# Patient Record
Sex: FEMALE | Race: WHITE
[De-identification: names, ages, dates, MRNs, and addresses within clinical notes are randomized per-mention and may not be internally consistent; named-entity substitution may affect disease eponyms.]

---

## 2017-11-08 ENCOUNTER — APPOINTMENT (OUTPATIENT)
Dept: OTOLARYNGOLOGY | Facility: CLINIC | Age: 37
End: 2017-11-08
Payer: COMMERCIAL

## 2017-11-08 VITALS
WEIGHT: 138 LBS | DIASTOLIC BLOOD PRESSURE: 79 MMHG | BODY MASS INDEX: 26.06 KG/M2 | HEART RATE: 70 BPM | SYSTOLIC BLOOD PRESSURE: 123 MMHG | TEMPERATURE: 97.2 F | HEIGHT: 61 IN

## 2017-11-08 PROCEDURE — 99213 OFFICE O/P EST LOW 20 MIN: CPT

## 2019-08-05 ENCOUNTER — APPOINTMENT (OUTPATIENT)
Dept: OTOLARYNGOLOGY | Facility: CLINIC | Age: 39
End: 2019-08-05
Payer: COMMERCIAL

## 2019-08-05 VITALS
DIASTOLIC BLOOD PRESSURE: 80 MMHG | WEIGHT: 138 LBS | HEART RATE: 82 BPM | HEIGHT: 61 IN | OXYGEN SATURATION: 98 % | BODY MASS INDEX: 26.06 KG/M2 | SYSTOLIC BLOOD PRESSURE: 120 MMHG

## 2019-08-05 DIAGNOSIS — L98.9 DISORDER OF THE SKIN AND SUBCUTANEOUS TISSUE, UNSPECIFIED: ICD-10-CM

## 2019-08-05 DIAGNOSIS — D11.0 BENIGN NEOPLASM OF PAROTID GLAND: ICD-10-CM

## 2019-08-05 PROCEDURE — 99213 OFFICE O/P EST LOW 20 MIN: CPT

## 2019-08-06 PROBLEM — L98.9 SKIN LESION: Status: ACTIVE | Noted: 2019-08-06

## 2019-08-08 NOTE — ASSESSMENT
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  Benign-appearing skin lesion, likely an age spot.\par \par F/U 3 months for re-evaluation.\par RTC sooner should any worrisome symptoms arise.\par Pt verbalized understanding of above.

## 2019-08-08 NOTE — PHYSICAL EXAM
[Normal] : cranial nerves 2-12 intact [de-identified] : left parotidectomy scar well-healed, no new masses, erythema, induration.  There is a 0.5 cm superficial linear brown macule next to the inferior margin of the parotid scar [de-identified] : normal facial nerve function [de-identified] : see above [de-identified] : b

## 2019-08-08 NOTE — HISTORY OF PRESENT ILLNESS
[de-identified] : 39F s/p left parotidectomy on 8/2/16 for a pleomorphic adenoma presents today with a dark spot near her parotid scar noticed by her boyfriend since July 2019.  She has tried scrubbing it without improvement.  She had seen derm in June and there was no mention re spot.  Otherwise, she feels well, no complaints.

## 2019-11-11 ENCOUNTER — APPOINTMENT (OUTPATIENT)
Dept: OTOLARYNGOLOGY | Facility: CLINIC | Age: 39
End: 2019-11-11